# Patient Record
Sex: FEMALE | Race: BLACK OR AFRICAN AMERICAN | Employment: UNEMPLOYED | URBAN - METROPOLITAN AREA
[De-identification: names, ages, dates, MRNs, and addresses within clinical notes are randomized per-mention and may not be internally consistent; named-entity substitution may affect disease eponyms.]

---

## 2017-02-13 ENCOUNTER — OFFICE VISIT (OUTPATIENT)
Dept: ENDOCRINOLOGY | Age: 47
End: 2017-02-13

## 2017-02-13 VITALS
RESPIRATION RATE: 16 BRPM | HEART RATE: 67 BPM | WEIGHT: 169.8 LBS | SYSTOLIC BLOOD PRESSURE: 115 MMHG | OXYGEN SATURATION: 100 % | DIASTOLIC BLOOD PRESSURE: 71 MMHG | HEIGHT: 66 IN | BODY MASS INDEX: 27.29 KG/M2

## 2017-02-13 DIAGNOSIS — Z79.4 TYPE 2 DIABETES MELLITUS WITHOUT COMPLICATION, WITH LONG-TERM CURRENT USE OF INSULIN (HCC): Primary | ICD-10-CM

## 2017-02-13 DIAGNOSIS — E78.49 OTHER HYPERLIPIDEMIA: ICD-10-CM

## 2017-02-13 DIAGNOSIS — I10 ESSENTIAL HYPERTENSION: ICD-10-CM

## 2017-02-13 DIAGNOSIS — E55.9 VITAMIN D DEFICIENCY: ICD-10-CM

## 2017-02-13 DIAGNOSIS — E11.9 TYPE 2 DIABETES MELLITUS WITHOUT COMPLICATION, WITH LONG-TERM CURRENT USE OF INSULIN (HCC): Primary | ICD-10-CM

## 2017-02-13 LAB — HBA1C MFR BLD: 6.5 %

## 2017-02-13 PROCEDURE — 83036 HEMOGLOBIN GLYCOSYLATED A1C: CPT | Performed by: INTERNAL MEDICINE

## 2017-02-13 PROCEDURE — 99214 OFFICE O/P EST MOD 30 MIN: CPT | Performed by: INTERNAL MEDICINE

## 2017-02-13 RX ORDER — CAL/VIT B12/FOLIC ACID/VIT B6
TABLET ORAL
Qty: 30 TABLET | Refills: 2 | Status: SHIPPED | OUTPATIENT
Start: 2017-02-13 | End: 2017-03-22 | Stop reason: SDUPTHER

## 2017-02-13 RX ORDER — ERGOCALCIFEROL 1.25 MG/1
CAPSULE ORAL
Qty: 13 CAPSULE | Refills: 2 | Status: SHIPPED | OUTPATIENT
Start: 2017-02-13 | End: 2017-09-12 | Stop reason: SDUPTHER

## 2017-02-13 RX ORDER — BIOTIN 1 MG
TABLET ORAL
Qty: 1 KIT | Refills: 0 | Status: SHIPPED | OUTPATIENT
Start: 2017-02-13

## 2017-02-13 RX ORDER — FENOFIBRATE 48 MG/1
TABLET, COATED ORAL
Qty: 90 TABLET | Refills: 2 | Status: SHIPPED | OUTPATIENT
Start: 2017-02-13 | End: 2017-12-07 | Stop reason: SDUPTHER

## 2017-02-13 RX ORDER — SIMVASTATIN 40 MG
TABLET ORAL
Qty: 90 TABLET | Refills: 1 | Status: SHIPPED | OUTPATIENT
Start: 2017-02-13 | End: 2017-09-12 | Stop reason: SDUPTHER

## 2017-03-22 ENCOUNTER — TELEPHONE (OUTPATIENT)
Dept: ENDOCRINOLOGY | Age: 47
End: 2017-03-22

## 2017-03-22 RX ORDER — CAL/VIT B12/FOLIC ACID/VIT B6
TABLET ORAL
Qty: 30 TABLET | Refills: 2 | Status: SHIPPED | OUTPATIENT
Start: 2017-03-22

## 2017-05-15 ENCOUNTER — OFFICE VISIT (OUTPATIENT)
Dept: ENDOCRINOLOGY | Age: 47
End: 2017-05-15

## 2017-05-15 VITALS
BODY MASS INDEX: 25.84 KG/M2 | OXYGEN SATURATION: 99 % | WEIGHT: 160.8 LBS | HEIGHT: 66 IN | HEART RATE: 69 BPM | RESPIRATION RATE: 16 BRPM | DIASTOLIC BLOOD PRESSURE: 64 MMHG | SYSTOLIC BLOOD PRESSURE: 115 MMHG

## 2017-05-15 DIAGNOSIS — Z79.4 TYPE 2 DIABETES MELLITUS WITHOUT COMPLICATION, WITH LONG-TERM CURRENT USE OF INSULIN (HCC): Primary | ICD-10-CM

## 2017-05-15 DIAGNOSIS — I10 ESSENTIAL HYPERTENSION: ICD-10-CM

## 2017-05-15 DIAGNOSIS — E78.49 OTHER HYPERLIPIDEMIA: ICD-10-CM

## 2017-05-15 DIAGNOSIS — E11.9 TYPE 2 DIABETES MELLITUS WITHOUT COMPLICATION, WITH LONG-TERM CURRENT USE OF INSULIN (HCC): Primary | ICD-10-CM

## 2017-05-15 DIAGNOSIS — E55.9 VITAMIN D DEFICIENCY: ICD-10-CM

## 2017-05-15 LAB — HBA1C MFR BLD: 7 %

## 2017-05-15 PROCEDURE — 99213 OFFICE O/P EST LOW 20 MIN: CPT | Performed by: INTERNAL MEDICINE

## 2017-05-15 PROCEDURE — 83036 HEMOGLOBIN GLYCOSYLATED A1C: CPT | Performed by: INTERNAL MEDICINE

## 2017-07-12 ENCOUNTER — TELEPHONE (OUTPATIENT)
Dept: ENDOCRINOLOGY | Age: 47
End: 2017-07-12

## 2017-08-16 ENCOUNTER — OFFICE VISIT (OUTPATIENT)
Dept: ENDOCRINOLOGY | Age: 47
End: 2017-08-16

## 2017-08-16 VITALS
HEIGHT: 66 IN | DIASTOLIC BLOOD PRESSURE: 69 MMHG | SYSTOLIC BLOOD PRESSURE: 107 MMHG | OXYGEN SATURATION: 100 % | WEIGHT: 158.4 LBS | RESPIRATION RATE: 16 BRPM | HEART RATE: 54 BPM | BODY MASS INDEX: 25.46 KG/M2

## 2017-08-16 DIAGNOSIS — E55.9 VITAMIN D DEFICIENCY: ICD-10-CM

## 2017-08-16 DIAGNOSIS — E78.5 HYPERLIPIDEMIA, UNSPECIFIED: ICD-10-CM

## 2017-08-16 DIAGNOSIS — I10 ESSENTIAL HYPERTENSION: ICD-10-CM

## 2017-08-16 DIAGNOSIS — E11.9 CONTROLLED TYPE 2 DIABETES MELLITUS WITHOUT COMPLICATION, WITHOUT LONG-TERM CURRENT USE OF INSULIN (HCC): Primary | ICD-10-CM

## 2017-08-16 DIAGNOSIS — Z79.4 TYPE 2 DIABETES MELLITUS WITHOUT COMPLICATION, WITH LONG-TERM CURRENT USE OF INSULIN (HCC): ICD-10-CM

## 2017-08-16 DIAGNOSIS — E11.9 TYPE 2 DIABETES MELLITUS WITHOUT COMPLICATION, WITH LONG-TERM CURRENT USE OF INSULIN (HCC): ICD-10-CM

## 2017-08-16 LAB
CHOLESTEROL, TOTAL: 158 MG/DL (ref 0–199)
HDLC SERPL-MCNC: 49 MG/DL (ref 40–60)
LDL CHOLESTEROL CALCULATED: 95 MG/DL
TRIGL SERPL-MCNC: 72 MG/DL (ref 0–150)
VITAMIN D 25-HYDROXY: 53.5 NG/ML
VLDLC SERPL CALC-MCNC: 14 MG/DL

## 2017-08-16 PROCEDURE — 99213 OFFICE O/P EST LOW 20 MIN: CPT | Performed by: INTERNAL MEDICINE

## 2017-08-17 ENCOUNTER — TELEPHONE (OUTPATIENT)
Dept: ENDOCRINOLOGY | Age: 47
End: 2017-08-17

## 2017-08-17 LAB
ESTIMATED AVERAGE GLUCOSE: 171.4 MG/DL
HBA1C MFR BLD: 7.6 %

## 2017-09-12 ENCOUNTER — TELEPHONE (OUTPATIENT)
Dept: ENDOCRINOLOGY | Age: 47
End: 2017-09-12

## 2017-09-12 RX ORDER — LISINOPRIL 20 MG/1
20 TABLET ORAL DAILY
Qty: 90 TABLET | Refills: 3 | Status: SHIPPED | OUTPATIENT
Start: 2017-09-12

## 2017-09-12 RX ORDER — SIMVASTATIN 40 MG
TABLET ORAL
Qty: 90 TABLET | Refills: 1 | Status: CANCELLED | OUTPATIENT
Start: 2017-09-12

## 2017-09-12 RX ORDER — ERGOCALCIFEROL 1.25 MG/1
CAPSULE ORAL
Qty: 13 CAPSULE | Refills: 2 | Status: CANCELLED | OUTPATIENT
Start: 2017-09-12

## 2017-09-12 RX ORDER — ERGOCALCIFEROL 1.25 MG/1
CAPSULE ORAL
Qty: 13 CAPSULE | Refills: 2 | Status: SHIPPED | OUTPATIENT
Start: 2017-09-12

## 2017-09-12 RX ORDER — LISINOPRIL 20 MG/1
20 TABLET ORAL DAILY
Qty: 90 TABLET | Refills: 3 | Status: CANCELLED | OUTPATIENT
Start: 2017-09-12

## 2017-09-12 RX ORDER — SIMVASTATIN 40 MG
TABLET ORAL
Qty: 90 TABLET | Refills: 1 | Status: SHIPPED | OUTPATIENT
Start: 2017-09-12 | End: 2019-04-05

## 2017-09-18 ENCOUNTER — TELEPHONE (OUTPATIENT)
Dept: ENDOCRINOLOGY | Age: 47
End: 2017-09-18

## 2017-12-07 RX ORDER — FENOFIBRATE 48 MG/1
TABLET, COATED ORAL
Qty: 90 TABLET | Refills: 2 | Status: SHIPPED | OUTPATIENT
Start: 2017-12-07

## 2018-03-28 NOTE — TELEPHONE ENCOUNTER
LOC: 8/16/17  NOV: none
normal/denies loose teeth/dentures - temporary partial bridge (fixed) upper/lower

## 2019-03-11 ENCOUNTER — TELEPHONE (OUTPATIENT)
Dept: PAIN MANAGEMENT | Age: 49
End: 2019-03-11

## 2019-04-05 ENCOUNTER — OFFICE VISIT (OUTPATIENT)
Dept: PAIN MANAGEMENT | Age: 49
End: 2019-04-05
Payer: MEDICARE

## 2019-04-05 ENCOUNTER — TELEPHONE (OUTPATIENT)
Dept: PAIN MANAGEMENT | Age: 49
End: 2019-04-05

## 2019-04-05 VITALS
HEIGHT: 66 IN | WEIGHT: 161 LBS | BODY MASS INDEX: 25.88 KG/M2 | DIASTOLIC BLOOD PRESSURE: 84 MMHG | HEART RATE: 90 BPM | SYSTOLIC BLOOD PRESSURE: 140 MMHG

## 2019-04-05 DIAGNOSIS — M96.1 FAILED BACK SYNDROME, LUMBOSACRAL: ICD-10-CM

## 2019-04-05 DIAGNOSIS — M50.30 BULGING OF CERVICAL INTERVERTEBRAL DISC: ICD-10-CM

## 2019-04-05 DIAGNOSIS — M50.90 CERVICAL DISC DISEASE: ICD-10-CM

## 2019-04-05 DIAGNOSIS — M54.41 CHRONIC BILATERAL LOW BACK PAIN WITH BILATERAL SCIATICA: ICD-10-CM

## 2019-04-05 DIAGNOSIS — G89.4 CHRONIC PAIN SYNDROME: Primary | ICD-10-CM

## 2019-04-05 DIAGNOSIS — M54.42 CHRONIC BILATERAL LOW BACK PAIN WITH BILATERAL SCIATICA: ICD-10-CM

## 2019-04-05 DIAGNOSIS — M51.37 DDD (DEGENERATIVE DISC DISEASE), LUMBOSACRAL: ICD-10-CM

## 2019-04-05 DIAGNOSIS — M25.551 RIGHT HIP PAIN: ICD-10-CM

## 2019-04-05 DIAGNOSIS — R53.83 FATIGUE, UNSPECIFIED TYPE: ICD-10-CM

## 2019-04-05 DIAGNOSIS — F51.01 PRIMARY INSOMNIA: ICD-10-CM

## 2019-04-05 DIAGNOSIS — F32.A DEPRESSION, UNSPECIFIED DEPRESSION TYPE: ICD-10-CM

## 2019-04-05 DIAGNOSIS — G89.29 CHRONIC RIGHT SHOULDER PAIN: ICD-10-CM

## 2019-04-05 DIAGNOSIS — Z98.890 H/O SHOULDER SURGERY: ICD-10-CM

## 2019-04-05 DIAGNOSIS — Z96.611 STATUS POST REPLACEMENT OF RIGHT SHOULDER JOINT: ICD-10-CM

## 2019-04-05 DIAGNOSIS — M25.511 CHRONIC RIGHT SHOULDER PAIN: ICD-10-CM

## 2019-04-05 DIAGNOSIS — M48.02 CERVICAL SPINAL STENOSIS: ICD-10-CM

## 2019-04-05 DIAGNOSIS — G89.29 CHRONIC BILATERAL LOW BACK PAIN WITH BILATERAL SCIATICA: ICD-10-CM

## 2019-04-05 PROCEDURE — G8419 CALC BMI OUT NRM PARAM NOF/U: HCPCS | Performed by: NURSE PRACTITIONER

## 2019-04-05 PROCEDURE — G8427 DOCREV CUR MEDS BY ELIG CLIN: HCPCS | Performed by: NURSE PRACTITIONER

## 2019-04-05 PROCEDURE — 99204 OFFICE O/P NEW MOD 45 MIN: CPT | Performed by: NURSE PRACTITIONER

## 2019-04-05 RX ORDER — DULOXETIN HYDROCHLORIDE 30 MG/1
30 CAPSULE, DELAYED RELEASE ORAL 2 TIMES DAILY
Qty: 60 CAPSULE | Refills: 0 | Status: SHIPPED | OUTPATIENT
Start: 2019-04-05 | End: 2019-05-03

## 2019-04-05 RX ORDER — ESTRADIOL 0.06 MG/D
1 PATCH TRANSDERMAL WEEKLY
COMMUNITY

## 2019-04-05 RX ORDER — AMITRIPTYLINE HYDROCHLORIDE 25 MG/1
25 TABLET, FILM COATED ORAL NIGHTLY
Qty: 28 TABLET | Refills: 0 | Status: SHIPPED | OUTPATIENT
Start: 2019-04-05 | End: 2019-05-03 | Stop reason: SDUPTHER

## 2019-04-05 RX ORDER — HYDROCODONE BITARTRATE AND ACETAMINOPHEN 5; 325 MG/1; MG/1
1 TABLET ORAL EVERY 8 HOURS PRN
Qty: 84 TABLET | Refills: 0 | Status: SHIPPED | OUTPATIENT
Start: 2019-04-05 | End: 2019-05-03 | Stop reason: SDUPTHER

## 2019-04-05 RX ORDER — FENOFIBRATE 145 MG/1
145 TABLET, COATED ORAL DAILY
COMMUNITY

## 2019-04-05 RX ORDER — METHOCARBAMOL 750 MG/1
750 TABLET, FILM COATED ORAL 2 TIMES DAILY
COMMUNITY

## 2019-04-05 RX ORDER — DICLOFENAC SODIUM 75 MG/1
75 TABLET, DELAYED RELEASE ORAL 2 TIMES DAILY
COMMUNITY

## 2019-04-05 NOTE — PROGRESS NOTES
HISTORY OF PRESENT ILLNESS:  Ms. Claire Abreu is a 50 y.o. female presents for consultation at the kind request of Dr. Angela Brown her orthopedics provider for chronic pain management. Her presenting problems are pain in the right shoulder, neck, lower back radiaiting to the lower extremities, right hip. Onset of pain in the lower back began in 2009 while working as a , reports having fallen while getting off the bus twisting her back in the process. She was treated by Dr. Surekha Barrientos with physical medicine and rehabilitation, as well as Dr. Micaela Barrera with orthopedic surgery. She had lumbar laminectomy surgery in 2015, then lumbar fusion in 2016 both by Dr. Siena Blake. Admits to having received ELLIOTT's in the cervical, and lumbar spine through [facility] which provided short term relief of pain. She was recommended for cervical surgery but declined. Continues to experience pain in the cervical/lumbar spine with increased physical activities. 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 Admits to a history of right rotator cuff and right biceps tear, arthritis in the right shoulder, frozen shoulder. She's had surgery to the right shoulder 3 times, first in 2013 under the care of Dr. Rosie Davis, then she had right rotator cuff arthroscopy/repair on 8/28/18, and total right shoulder replacement surgery on 2/18/19 both with Dr. Saadia Carroll. Admits to having pain in the right shoulder mainly when lifting objects, performing daily activities. Percocet 5-325 mg have primarily been prescribed by orthopedics post surgery, with the last prescription having pain 2/18/19 x9 days. Other health conditions include DM2 with last hemoglobin A1c of 7.1 three months ago, GERD, HLD, hypertension. She had umbilical hernia repair on 2/21/2019 with Dr. Deonna Crane. She rates the pain in the lower back/right shoulder at 7/10, 5/10 in the legs, 8/10 in the neck.  She describes it as aching, burning, numbness, tingling, pins and needles, stabbing. Pain is greater in her neck/right shoulder than lower back. Pain is made worse by: lifting, straining, carrying groceries, reaching overhead, sleeping. Activities that have been limited by pain that she otherwise tolerated well are socializing, and cooking. Alternative therapies she haspreviously attempted are pain medications, anti-inflammatories, muscle relaxants, oral steroids, local injections, ice/heat pack, traction, brace/cane, ELLIOTT's, TENS unit, exercise by therapist, manipulation by chiropractor, massage therapy. Current treatment regimen has helped relieve about 30% of the pain. Relieving factors of pain include nothing. She denies side effects from the current pain regimen. In the last week she reports having extreme bothersome symptoms to the lower back, legs, numbness weakness to the feet all the time. Pain prevents her from dressing without discomfort, lifting heavy objects, sitting for more than 30 minutes, standing more than 30 minutes. She essentially has no social/recreational or sexual life due to pain. Patient reports mood is depressed and that she has no suicidal/homicidal inclination. Depression stems from the loss of her son in 2016 getting hit by a  that drove into their home by accident, pain, marital issues/stressors, reports  left her home. Admits to having been treated b psychiatry in the past, was reluctant to take antidepressants. Sleep patterns are poor with an average of 3-4 hours due to pain. Patient denies neurological bowel or bladder. Patient denies currently misusing/abusing her narcotic pain medications or using any illegal drugs. Admitted to having used marijuana a couple of weeks ago after she ran out of pain medications to assess pain relief, she got no pain relief from marijuana. she admits to morning stiffness, fatigue, and headaches. Reports headaches are mainly due to cervical pain.  Currently lives with her 2 sons, and is on disability. Denies smoking cigarettes     ROS:  The patient's social history, past medical history, family history, medications, allergies and review of systems have all been reviewed and verified from  the patient questionnaire form which has been filled by the patient. The  allergies, medication list  and past medical and surgical history and other information recorded by the MA was again reviewed on April 6, 2019. Please check page 6 of the patient questionnaire for for family history  These forms have been scanned into the \"media\" tab of patients electronic medical record. Family History   Problem Relation Age of Onset    Diabetes Mother     High Blood Pressure Father     Liver Disease Father         cirrhosis of the liver    Liver Disease Sister     High Blood Pressure Sister     Alzheimer's Disease Maternal Grandmother     Diabetes Sister     High Blood Pressure Brother     High Cholesterol Brother     ADHD Son     Other Son         PTSD    Other Son         traumatic brain injury       Past Medical History:   Diagnosis Date    Back pain, chronic     Hyperlipidemia     Hypertension     Type II or unspecified type diabetes mellitus without mention of complication, not stated as uncontrolled           Past Surgical History:   Procedure Laterality Date    BACK SURGERY      BREAST SURGERY      breast reduction    CHOLECYSTECTOMY      SHOULDER SURGERY Right     TUBAL LIGATION         Physical Exam   Constitutional: She is oriented to person, place, and time. She appears well-developed and well-nourished. No distress. HENT:   Head: Normocephalic and atraumatic. Nose: Nose normal.   Mouth/Throat: Oropharynx is clear and moist.   Eyes: Pupils are equal, round, and reactive to light. Conjunctivae and EOM are normal.   Neck: Normal range of motion. Neck supple. No thyromegaly present.    Cardiovascular: Normal rate, regular rhythm, normal heart sounds and intact distal pulses. Pulmonary/Chest: Effort normal and breath sounds normal. No respiratory distress. She exhibits no tenderness. Abdominal: Soft. Bowel sounds are normal. She exhibits no distension. There is tenderness (Mild epigastric tenderness with palpation). No hernia. Musculoskeletal: She exhibits tenderness. She exhibits no edema. Right shoulder: She exhibits decreased range of motion and tenderness (Guarded pain with 50° flexion/abduction). She exhibits no swelling and no deformity. Right hip: She exhibits decreased range of motion, decreased strength and tenderness (40° flexion with guarded hip/lumbar pain). Left hip: She exhibits tenderness (Tenderness to the lumbar spine with 70° flexion). Cervical back: She exhibits decreased range of motion, tenderness (Guarded pain with 45° rotation to the L/R, palpation) and bony tenderness. Lumbar back: She exhibits decreased range of motion, tenderness (Guarded pain with 30° flexion, 5° extension, 5° lateral bend) and bony tenderness. Right upper leg: She exhibits tenderness. Left upper leg: She exhibits tenderness. Right lower leg: She exhibits tenderness. Left lower leg: She exhibits tenderness. Lymphadenopathy:     She has no cervical adenopathy. She has no axillary adenopathy. Neurological: She is alert and oriented to person, place, and time. She has normal strength and normal reflexes. She displays normal reflexes. No cranial nerve deficit or sensory deficit. She displays a negative Romberg sign. Gait normal.   Reflex Scores:       Patellar reflexes are 2+ on the right side and 2+ on the left side. Achilles reflexes are 2+ on the right side and 2+ on the left side. Skin: Skin is warm, dry and intact. No rash noted. No cyanosis. Nails show no clubbing. Right shoulder scar, lumbar region   Psychiatric: Her speech is normal and behavior is normal. Her affect is blunt.  She exhibits a depressed mood (tearful). MRI of the Lumbar spine 2/13/17:  Previous lumbar fusion with anterior fixation plate and discectomy/bone  graft at L5-S1. No residual canal or foraminal stenosis at this level. Previous left hemilaminectomy at the L5-S1 level with some enhancing  scar tissue seen surrounding the descending left S1 nerve root within  the lateral recess. Lumbar spondylosis as described in detail above within the upper and  mid lumbar spine, with no evidence of additional nerve root impingement  or central stenosis. MRI of the Cervical spine 1/22/16:  1. Multiple cervical disc protrusions as described. 2. C5-C6 disc protrusion with mild spinal cord impingement. 3. C7-T1 right foraminal disc protrusion causing moderate foraminal  stenosis and potential impingement of the right C8 nerve. Correlate for  right C8 radiculopathy. MRI of the upper right Extremity 4/4/18:  1. There is edema identified within the proximal supraspinatus musculotendinous junction consistent with a muscle strain. 2. Small partial-thickness bursal surface tear is identified involving the critical zone fibers of the supraspinatus tendon. No full-thickness rotator cuff tear is identified. 3. Mild subacromial and subdeltoid edema is present. This can be seen with a mild bursitis. 4. Mild degenerative change is present at the acromioclavicular joint. 5. Torn and distally retracted biceps tendon. 6. Abnormal linear signal is identified within the superior and posterior superior labrum consistent with a labral tear. BP (!) 140/84   Pulse 90   Ht 5' 6\" (1.676 m)   Wt 161 lb (73 kg)   LMP 12/08/2015   BMI 25.99 kg/m²      ASSESSMENT:    1. Chronic pain syndrome    2. Status post replacement of right shoulder joint, with Dr. Yakelin Willis 2/18/19    3. Chronic right shoulder pain    4. H/O shoulder surgery, 2013/2018    5. DDD (degenerative disc disease), lumbosacral    6.  Chronic bilateral low back pain with bilateral sciatica    7. Failed back syndrome, lumbosacral    8. Right hip pain    9. Cervical disc disease    10. Bulging of cervical intervertebral disc    11. Cervical spinal stenosis    12. Depression, unspecified depression type    13. Primary insomnia    14. Fatigue, unspecified type         PLAN:   -Chronic opiate treatment protocol was discussed withthe patient, informed consent was obtained. -Treatment guidelines were discussed and established  -Risks and benefits of narcotics were addressedwith the patient  - Obtainable long term and short term goals of opioid therapy were reviewed, including pain relief, sleep, psychosocial and physical functioning   -Obtain urine Toxicology today  -Take Norco 5-3 25 mg 3 times a day when necessary, Elavil 25 mg by mouth nightly, Cymbalta 30 mg by mouth daily, ZTLido 1.8% topical daily  -Maintain PT, Pilar exercises/cervical stretches  -Reviewed previous imaging studies/blood work  -TSH was ordered-fatigue/depression  -Continue to follow-up with Dr. Rigo Hollingsworth post right shoulder replacement surgery  -Counseled patient on positive coping skills related to her son's death, and family related stressors. Recommended psychological counseling, patient was agreeable. Referral was provided for psychological couseling  -Informed patient that opioids cannot be prescribed for her while using marijuana, she can otherwise be treated with non opioids if she chose to continue with marijuana. Advised to quit using marijuana if she hoped to obtain opioids therapy for pain.  Patient verbalized understanding, and reports only having used marijuana once, will reevaluate after examining UDS results  -CBT techniques- relaxation therapies such as biofeedback, mindfulness based stress reduction, imagery, cognitive restructuring, problem solving discussed with patient  -she was advised proper sleep hygiene-told to avoid:use of caffeine or other stimulants after noon, alcohol use near bedtime, long or frequent naps during the day, erratic sleep schedule, heavy meals near bedtime, vigorous exercise near bedtime and use of electronic devices near bedtime  -SOAPP score 4  -ORT score 2  -PHQ-9 score 22 severe depression  -Return in about 1 month (around 5/3/2019). Controlled Substances Monitoring: Attestation: The Prescription Monitoring Report for this patient was reviewed today.  DIMITRI Reagan - EMERY)

## 2019-04-05 NOTE — PATIENT INSTRUCTIONS
Patient Education        Back Stretches: Exercises  Your Care Instructions  Here are some examples of exercises for stretching your back. Start each exercise slowly. Ease off the exercise if you start to have pain. Your doctor or physical therapist will tell you when you can start these exercises and which ones will work best for you. How to do the exercises  Overhead stretch    1. Stand comfortably with your feet shoulder-width apart. 2. Looking straight ahead, raise both arms over your head and reach toward the ceiling. Do not allow your head to tilt back. 3. Hold for 15 to 30 seconds, then lower your arms to your sides. 4. Repeat 2 to 4 times. Side stretch    1. Stand comfortably with your feet shoulder-width apart. 2. Raise one arm over your head, and then lean to the other side. 3. Slide your hand down your leg as you let the weight of your arm gently stretch your side muscles. Hold for 15 to 30 seconds. 4. Repeat 2 to 4 times on each side. Press-up    1. Lie on your stomach, supporting your body with your forearms. 2. Press your elbows down into the floor to raise your upper back. As you do this, relax your stomach muscles and allow your back to arch without using your back muscles. As your press up, do not let your hips or pelvis come off the floor. 3. Hold for 15 to 30 seconds, then relax. 4. Repeat 2 to 4 times. Relax and rest    1. Lie on your back with a rolled towel under your neck and a pillow under your knees. Extend your arms comfortably to your sides. 2. Relax and breathe normally. 3. Remain in this position for about 10 minutes. 4. If you can, do this 2 or 3 times each day. Follow-up care is a key part of your treatment and safety. Be sure to make and go to all appointments, and call your doctor if you are having problems. It's also a good idea to know your test results and keep a list of the medicines you take. Where can you learn more?   Go to shoulder. 4. Hold 15 to 30 seconds. 5. Repeat 2 to 4 times. Shoulder blade squeeze    1. Stand with your arms at your sides, and squeeze your shoulder blades together. Do not raise your shoulders up as you squeeze. 2. Hold 6 seconds. 3. Repeat 8 to 12 times. Resisted rows    1. Put the band around a solid object at about waist level. (A bedpost will work well.) Each hand should hold an end of the band. 2. With your elbows at your sides and bent to 90 degrees, pull the band back. Your shoulder blades should move toward each other. Return to the starting position. 3. Repeat 8 to 12 times. External rotator strengthening exercise    1. Start by tying a piece of elastic exercise material to a doorknob. You can use surgical tubing or Thera-Band. (You may also hold one end of the band in each hand.)  2. Stand or sit with your shoulder relaxed and your elbow bent 90 degrees. Your upper arm should rest comfortably against your side. Squeeze a rolled towel between your elbow and your body for comfort. This will help keep your arm at your side. 3. Hold one end of the elastic band with the hand of the painful arm. 4. Start with your forearm across your belly. Slowly rotate the forearm out away from your body. Keep your elbow and upper arm tucked against the towel roll or the side of your body until you begin to feel tightness in your shoulder. Slowly move your arm back to where you started. 5. Repeat 8 to 12 times. Internal rotator strengthening exercise    1. Start by tying a piece of elastic exercise material to a doorknob. You can use surgical tubing or Thera-Band. 2. Stand or sit with your shoulder relaxed and your elbow bent 90 degrees. Your upper arm should rest comfortably against your side. Squeeze a rolled towel between your elbow and your body for comfort. This will help keep your arm at your side. 3. Hold one end of the elastic band in the hand of the painful arm.   4. Slowly rotate your forearm toward your body until it touches your belly. Slowly move it back to where you started. 5. Keep your elbow and upper arm firmly tucked against the towel roll or at your side. 6. Repeat 8 to 12 times. Pendulum swing    1. Hold on to a table or the back of a chair with your good arm. Then bend forward a little and let your sore arm hang straight down. This exercise does not use the arm muscles. Rather, use your legs and your hips to create movement that makes your arm swing freely. 2. Use the movement from your hips and legs to guide the slightly swinging arm back and forth like a pendulum (or elephant trunk). Then guide it in circles that start small (about the size of a dinner plate). Make the circles a bit larger each day, as your pain allows. 3. Do this exercise for 5 minutes, 5 to 7 times each day. 4. As you have less pain, try bending over a little farther to do this exercise. This will increase the amount of movement at your shoulder. Follow-up care is a key part of your treatment and safety. Be sure to make and go to all appointments, and call your doctor if you are having problems. It's also a good idea to know your test results and keep a list of the medicines you take. Where can you learn more? Go to https://[email or web address]. org and sign in to your [employer] account. Enter H562 in the [facility] box to learn more about \"Shoulder Arthritis: Exercises. \"     If you do not have an account, please click on the \"Sign Up Now\" link. Current as of: September 20, 2018  Content Version: 11.9  © 8899-4820 [employer]. Care instructions adapted under license by [facility] ([facility]). If you have questions about a medical condition or this instruction, always ask your healthcare professional. [place] [address] any warranty or liability for your use of this information.          Patient Education        Neck Arthritis: Exercises  Your Care Instructions  Here are

## 2019-04-05 NOTE — TELEPHONE ENCOUNTER
I received a pa for ZTlido 1.8% EX PTCH, the only approved diagnoses are as follows: Diabetic neuropathy, Neuropathy due to shingles. Would PKA like to prescribe the patient something else? Please advise. Thank you.

## 2019-04-09 ENCOUNTER — TELEPHONE (OUTPATIENT)
Dept: PAIN MANAGEMENT | Age: 49
End: 2019-04-09

## 2019-04-09 NOTE — TELEPHONE ENCOUNTER
Per PKA if patient has side effects with the Cymbalta then she may discontinue it. Called patient to advise that she must follow up with medication issues here with us other than her PCP. Patient did not answer, phone kept ringing.

## 2019-04-09 NOTE — TELEPHONE ENCOUNTER
Pt calling, states she has no appetite on Cymbalta and it also makes her feel strange/paranoid. She states she lost 4 pounds from [place] [place] to now. She also reports her blood sugar has been low and she has had dry mouth. She states she spoke to her PCP and will be stopping this medication per their advise.

## 2019-04-15 ENCOUNTER — TELEPHONE (OUTPATIENT)
Dept: PAIN MANAGEMENT | Age: 49
End: 2019-04-15

## 2019-04-15 DIAGNOSIS — M25.552 LEFT HIP PAIN: ICD-10-CM

## 2019-04-15 DIAGNOSIS — G89.4 CHRONIC PAIN SYNDROME: Primary | ICD-10-CM

## 2019-04-15 NOTE — TELEPHONE ENCOUNTER
Pt is calling to inform PKA that the Xray was ordered wrong. Pt states that xray was ordered for the Rt hip but needs to be done for the Lt not Rt. Pls call pt and advise when order is changed.

## 2019-04-15 NOTE — TELEPHONE ENCOUNTER
Note regarding overdue X-Ray ordered on 04/05/19. Per PKA , this testing was requested but not urgent. We will follow up with patient at their next office visit, no follow up is necessary at this time.

## 2019-05-01 ENCOUNTER — HOSPITAL ENCOUNTER (OUTPATIENT)
Dept: GENERAL RADIOLOGY | Age: 49
Discharge: HOME | End: 2019-05-01
Payer: MEDICARE

## 2019-05-01 ENCOUNTER — HOSPITAL ENCOUNTER (OUTPATIENT)
Age: 49
Discharge: HOME | End: 2019-05-01
Payer: MEDICARE

## 2019-05-01 ENCOUNTER — TELEPHONE (OUTPATIENT)
Dept: PAIN MANAGEMENT | Age: 49
End: 2019-05-01

## 2019-05-01 DIAGNOSIS — G89.4 CHRONIC PAIN SYNDROME: ICD-10-CM

## 2019-05-01 DIAGNOSIS — R53.83 FATIGUE, UNSPECIFIED TYPE: ICD-10-CM

## 2019-05-01 DIAGNOSIS — M25.551 RIGHT HIP PAIN: ICD-10-CM

## 2019-05-01 LAB — TSH SERPL DL<=0.05 MIU/L-ACNC: 1.23 UIU/ML (ref 0.27–4.2)

## 2019-05-01 PROCEDURE — 73502 X-RAY EXAM HIP UNI 2-3 VIEWS: CPT

## 2019-05-03 ENCOUNTER — OFFICE VISIT (OUTPATIENT)
Dept: PAIN MANAGEMENT | Age: 49
End: 2019-05-03
Payer: MEDICARE

## 2019-05-03 VITALS — OXYGEN SATURATION: 100 % | HEART RATE: 62 BPM | SYSTOLIC BLOOD PRESSURE: 142 MMHG | DIASTOLIC BLOOD PRESSURE: 84 MMHG

## 2019-05-03 DIAGNOSIS — M50.90 CERVICAL DISC DISEASE: ICD-10-CM

## 2019-05-03 DIAGNOSIS — R53.83 FATIGUE, UNSPECIFIED TYPE: ICD-10-CM

## 2019-05-03 DIAGNOSIS — M51.37 DDD (DEGENERATIVE DISC DISEASE), LUMBOSACRAL: ICD-10-CM

## 2019-05-03 DIAGNOSIS — M50.30 BULGING OF CERVICAL INTERVERTEBRAL DISC: ICD-10-CM

## 2019-05-03 DIAGNOSIS — G89.4 CHRONIC PAIN SYNDROME: ICD-10-CM

## 2019-05-03 DIAGNOSIS — M48.02 CERVICAL SPINAL STENOSIS: ICD-10-CM

## 2019-05-03 DIAGNOSIS — G89.29 CHRONIC BILATERAL LOW BACK PAIN WITH BILATERAL SCIATICA: ICD-10-CM

## 2019-05-03 DIAGNOSIS — F51.01 PRIMARY INSOMNIA: ICD-10-CM

## 2019-05-03 DIAGNOSIS — G89.4 CHRONIC PAIN SYNDROME: Primary | ICD-10-CM

## 2019-05-03 DIAGNOSIS — F32.A DEPRESSION, UNSPECIFIED DEPRESSION TYPE: ICD-10-CM

## 2019-05-03 DIAGNOSIS — M54.42 CHRONIC BILATERAL LOW BACK PAIN WITH BILATERAL SCIATICA: ICD-10-CM

## 2019-05-03 DIAGNOSIS — M96.1 FAILED BACK SYNDROME, LUMBOSACRAL: ICD-10-CM

## 2019-05-03 DIAGNOSIS — Z96.611 STATUS POST REPLACEMENT OF RIGHT SHOULDER JOINT: ICD-10-CM

## 2019-05-03 DIAGNOSIS — M16.12 PRIMARY OSTEOARTHRITIS OF LEFT HIP: ICD-10-CM

## 2019-05-03 DIAGNOSIS — F41.9 ANXIETY: ICD-10-CM

## 2019-05-03 DIAGNOSIS — Z98.890 H/O SHOULDER SURGERY: ICD-10-CM

## 2019-05-03 DIAGNOSIS — M54.41 CHRONIC BILATERAL LOW BACK PAIN WITH BILATERAL SCIATICA: ICD-10-CM

## 2019-05-03 PROCEDURE — 1036F TOBACCO NON-USER: CPT | Performed by: NURSE PRACTITIONER

## 2019-05-03 PROCEDURE — G8419 CALC BMI OUT NRM PARAM NOF/U: HCPCS | Performed by: NURSE PRACTITIONER

## 2019-05-03 PROCEDURE — G8427 DOCREV CUR MEDS BY ELIG CLIN: HCPCS | Performed by: NURSE PRACTITIONER

## 2019-05-03 PROCEDURE — 99214 OFFICE O/P EST MOD 30 MIN: CPT | Performed by: NURSE PRACTITIONER

## 2019-05-03 RX ORDER — HYDROCODONE BITARTRATE AND ACETAMINOPHEN 5; 325 MG/1; MG/1
1 TABLET ORAL EVERY 8 HOURS PRN
Qty: 30 TABLET | Refills: 0 | Status: SHIPPED | OUTPATIENT
Start: 2019-05-03 | End: 2019-05-13

## 2019-05-03 RX ORDER — ESCITALOPRAM OXALATE 10 MG/1
10 TABLET ORAL DAILY
Qty: 30 TABLET | Refills: 3 | Status: SHIPPED | OUTPATIENT
Start: 2019-05-03 | End: 2019-05-03 | Stop reason: SDUPTHER

## 2019-05-03 RX ORDER — LIDOCAINE 4 G/G
1 PATCH TOPICAL DAILY
Qty: 30 PATCH | Refills: 0 | Status: SHIPPED | OUTPATIENT
Start: 2019-05-03 | End: 2019-06-02

## 2019-05-03 RX ORDER — AMITRIPTYLINE HYDROCHLORIDE 25 MG/1
25 TABLET, FILM COATED ORAL NIGHTLY
Qty: 28 TABLET | Refills: 0 | Status: SHIPPED | OUTPATIENT
Start: 2019-05-03 | End: 2019-05-03 | Stop reason: SDUPTHER

## 2019-05-03 RX ORDER — HYDROCODONE BITARTRATE AND ACETAMINOPHEN 5; 325 MG/1; MG/1
1 TABLET ORAL EVERY 8 HOURS PRN
Qty: 84 TABLET | Refills: 0 | Status: SHIPPED | OUTPATIENT
Start: 2019-05-03 | End: 2019-05-03 | Stop reason: SDUPTHER

## 2019-05-03 NOTE — PROGRESS NOTES
Nia Crespo [place]  1970  [id number]      HISTORY OF PRESENT ILLNESS:  Ms. Grace Levy is a 50 y.o. female returns for a follow up visit for pain management  She has a diagnosis of   1. Chronic pain syndrome    2. Status post replacement of right shoulder joint, with Dr. Sabra Abarca 2/18/19    3. H/O shoulder surgery, 2013/2018    4. DDD (degenerative disc disease), lumbosacral    5. Chronic bilateral low back pain with bilateral sciatica    6. Failed back syndrome, lumbosacral    7. Primary osteoarthritis of left hip, mild    8. Cervical disc disease    9. Bulging of cervical intervertebral disc    10. Cervical spinal stenosis    11. Fatigue, unspecified type    12. Primary insomnia    13. Depression, unspecified depression type    14. Anxiety      On the Patients Pain Assessment form:  She complains of pain in the right arm(s): entire limb, left buttocks, left knee(s), left leg(s): entire limb, left lower back, neck and right shoulder(s): entire area She rates the pain 7/10 and describes it as sharp, aching. Current treatment regimen has helped relieve about 30% of the pain. She has constipation and headache any side effects from the current pain regimen. Patient reports that since the last follow up visit the physical functioning is unchanged, family/social relationships are unchanged, mood is unchanged sleep patterns are worse, and that the overall functioning is unchanged. Patient denies misusing/abusing her narcotic pain medications or using any illegal drugs. There are No indicators for possible drug abuse, addiction or diversion problems. Upon obtaining medical history from Ms. Crespo states that pain is manageable on current pain therapy. Stopped taking the Cymbalta after a week due to side effects of suicidal thoughts, Elavil makes her drowsy during the daytime. Still has right shoulder pain, still recovering post right shoulder replacement 2 months ago.  Pain medications moderately provides relief of pain to the right shoulder for a short time. Mood is depressed she started following up with psychologist Ms Mary Mccall. Reports having felt much better after her visit. Sleep is improved with an average of 5-6 hours, except for daytime solemnest. Admits to having issues of constipation, currently scheduled for colonoscopy next week, admits to history of constipation. Tolerating activities/house chores with moderate tenderness to the back pain/right shoulder. Currently in PT    ALLERGIES: Patients list of allergies were reviewed     MEDICATIONS: Ms. Lagunas Risk list of medications were reviewed. Her current medications are   Outpatient Medications Prior to Visit   Medication Sig Dispense Refill    estradiol (CLIMARA) 0.06 MG/24HR Place 1 patch onto the skin once a week      methocarbamol (ROBAXIN) 750 MG tablet Take 750 mg by mouth 2 times daily      fenofibrate (TRICOR) 145 MG tablet Take 145 mg by mouth daily      metFORMIN (GLUCOPHAGE) 500 MG tablet Take 500 mg by mouth      diclofenac (VOLTAREN) 75 MG EC tablet Take 75 mg by mouth 2 times daily      fenofibrate (TRICOR) 48 MG tablet TAKE 1 TABLET BY MOUTH DAILY. 90 tablet 2    vitamin D (ERGOCALCIFEROL) 81481 units CAPS capsule TAKE 1 CAPSULE  WEEKLY 13 capsule 2    lisinopril (PRINIVIL;ZESTRIL) 20 MG tablet Take 1 tablet by mouth daily 90 tablet 3    Folic Acid-Vit R9-VNX C70 0.4-50-0.1 MG TABS One tab daily 30 tablet 2    glucose blood VI test strips (TRUETRACK TEST) strip CHECK GLUCOSE 4 TIMES A  each 5    Blood Glucose Monitoring Suppl (TRUETRACK BLOOD GLUCOSE) W/DEVICE KIT As needed 1 kit 0    B-D ULTRAFINE III SHORT PEN 31G X 8 MM MISC USES UP TO 4 A  each 3    carvedilol (COREG) 3.125 MG tablet Take 3.125 mg by mouth 2 times daily (with meals).  HYDROcodone-acetaminophen (NORCO) 5-325 MG per tablet Take 1 tablet by mouth every 8 hours as needed for Pain for up to 28 days.  84 tablet 0    amitriptyline (ELAVIL) 25 MG tablet Take 1 tablet by mouth nightly 28 tablet 0    DULoxetine (CYMBALTA) 30 MG extended release capsule Take 1 capsule by mouth 2 times daily 60 capsule 0    Lidocaine 1.8 % PTCH Apply 1 patch topically daily 30 patch 0     No facility-administered medications prior to visit. SOCIAL/FAMILY/PAST MEDICAL HISTORY: Ms. Diana Mancera, family and past medical history was reviewed. REVIEW OF SYSTEMS:    Respiratory: Negative for apnea, chest tightness and shortness of breath or change in baseline breathing. Gastrointestinal: Negative for nausea, vomiting, abdominal pain, diarrhea, constipation, blood in stool and abdominal distention. PHYSICAL EXAM:   Nursing note and vitals reviewed. BP (!) 142/84   Pulse 62   LMP 12/08/2015   SpO2 100%   Constitutional: She appears well-developed and well-nourished. No acute distress. Skin: Skin is warm and dry, good turgor. No rash noted. She is not diaphoretic. Cardiovascular: Normal rate, regular rhythm, normal heart sounds, and does not have murmur. Pulmonary/Chest: Effort normal. No respiratory distress. She does not have wheezes in the lung fields. She has no rales. Neurological/Psychiatric:She is alert and oriented to person, place, and time. Coordination is  normal.  Her mood isAppropriate and affect is Flat/blunted . Xray of the Left Hip 5/1/19:  No acute bony pathology.       Minimal degenerative changes     IMPRESSION:   1. Chronic pain syndrome    2. Status post replacement of right shoulder joint, with Dr. Joseph Guzman 2/18/19    3. H/O shoulder surgery, 2013/2018    4. DDD (degenerative disc disease), lumbosacral    5. Chronic bilateral low back pain with bilateral sciatica    6. Failed back syndrome, lumbosacral    7. Primary osteoarthritis of left hip, mild    8. Cervical disc disease    9. Bulging of cervical intervertebral disc    10. Cervical spinal stenosis    11.  Fatigue, unspecified type  metFORMIN (GLUCOPHAGE) 500 MG tablet Take 500 mg by mouth      diclofenac (VOLTAREN) 75 MG EC tablet Take 75 mg by mouth 2 times daily      fenofibrate (TRICOR) 48 MG tablet TAKE 1 TABLET BY MOUTH DAILY. 90 tablet 2    vitamin D (ERGOCALCIFEROL) 64485 units CAPS capsule TAKE 1 CAPSULE  WEEKLY 13 capsule 2    lisinopril (PRINIVIL;ZESTRIL) 20 MG tablet Take 1 tablet by mouth daily 90 tablet 3    Folic Acid-Vit E0-YKH B35 0.4-50-0.1 MG TABS One tab daily 30 tablet 2    glucose blood VI test strips (TRUETRACK TEST) strip CHECK GLUCOSE 4 TIMES A  each 5    Blood Glucose Monitoring Suppl (TRUETRACK BLOOD GLUCOSE) W/DEVICE KIT As needed 1 kit 0    B-D ULTRAFINE III SHORT PEN 31G X 8 MM MISC USES UP TO 4 A  each 3    carvedilol (COREG) 3.125 MG tablet Take 3.125 mg by mouth 2 times daily (with meals). No current facility-administered medications for this visit. I will continue her current medication regimen  which is part of the above treatment schedule. It has been helping with Ms. Zak Hunt's chronic  medical problems which for this visit include: The primary encounter diagnosis was Chronic pain syndrome. Diagnoses of Status post replacement of right shoulder joint, with Dr. Davis Going 2/18/19, H/O shoulder surgery, 2013/2018, DDD (degenerative disc disease), lumbosacral, Chronic bilateral low back pain with bilateral sciatica, Failed back syndrome, lumbosacral, Primary osteoarthritis of left hip, mild, Cervical disc disease, Bulging of cervical intervertebral disc, Cervical spinal stenosis, Fatigue, unspecified type, Primary insomnia, Depression, unspecified depression type, and Anxiety were also pertinent to this visit. Risks and benefits of the medications and other alternative treatments  including no treatment were discussed with the patient. The common side effects of these medications were also explained to the patient. Informed verbal consent was obtained.

## 2019-05-03 NOTE — PATIENT INSTRUCTIONS
Patient Education        Back Stretches: Exercises  Your Care Instructions  Here are some examples of exercises for stretching your back. Start each exercise slowly. Ease off the exercise if you start to have pain. Your doctor or physical therapist will tell you when you can start these exercises and which ones will work best for you. How to do the exercises  Overhead stretch    1. Stand comfortably with your feet shoulder-width apart. 2. Looking straight ahead, raise both arms over your head and reach toward the ceiling. Do not allow your head to tilt back. 3. Hold for 15 to 30 seconds, then lower your arms to your sides. 4. Repeat 2 to 4 times. Side stretch    1. Stand comfortably with your feet shoulder-width apart. 2. Raise one arm over your head, and then lean to the other side. 3. Slide your hand down your leg as you let the weight of your arm gently stretch your side muscles. Hold for 15 to 30 seconds. 4. Repeat 2 to 4 times on each side. Press-up    1. Lie on your stomach, supporting your body with your forearms. 2. Press your elbows down into the floor to raise your upper back. As you do this, relax your stomach muscles and allow your back to arch without using your back muscles. As your press up, do not let your hips or pelvis come off the floor. 3. Hold for 15 to 30 seconds, then relax. 4. Repeat 2 to 4 times. Relax and rest    1. Lie on your back with a rolled towel under your neck and a pillow under your knees. Extend your arms comfortably to your sides. 2. Relax and breathe normally. 3. Remain in this position for about 10 minutes. 4. If you can, do this 2 or 3 times each day. Follow-up care is a key part of your treatment and safety. Be sure to make and go to all appointments, and call your doctor if you are having problems. It's also a good idea to know your test results and keep a list of the medicines you take. Where can you learn more?   Go to shoulder. 4. Hold 15 to 30 seconds. 5. Repeat 2 to 4 times. Shoulder blade squeeze    1. Stand with your arms at your sides, and squeeze your shoulder blades together. Do not raise your shoulders up as you squeeze. 2. Hold 6 seconds. 3. Repeat 8 to 12 times. Resisted rows    1. Put the band around a solid object at about waist level. (A bedpost will work well.) Each hand should hold an end of the band. 2. With your elbows at your sides and bent to 90 degrees, pull the band back. Your shoulder blades should move toward each other. Return to the starting position. 3. Repeat 8 to 12 times. External rotator strengthening exercise    1. Start by tying a piece of elastic exercise material to a doorknob. You can use surgical tubing or Thera-Band. (You may also hold one end of the band in each hand.)  2. Stand or sit with your shoulder relaxed and your elbow bent 90 degrees. Your upper arm should rest comfortably against your side. Squeeze a rolled towel between your elbow and your body for comfort. This will help keep your arm at your side. 3. Hold one end of the elastic band with the hand of the painful arm. 4. Start with your forearm across your belly. Slowly rotate the forearm out away from your body. Keep your elbow and upper arm tucked against the towel roll or the side of your body until you begin to feel tightness in your shoulder. Slowly move your arm back to where you started. 5. Repeat 8 to 12 times. Internal rotator strengthening exercise    1. Start by tying a piece of elastic exercise material to a doorknob. You can use surgical tubing or Thera-Band. 2. Stand or sit with your shoulder relaxed and your elbow bent 90 degrees. Your upper arm should rest comfortably against your side. Squeeze a rolled towel between your elbow and your body for comfort. This will help keep your arm at your side. 3. Hold one end of the elastic band in the hand of the painful arm.   4. Slowly rotate your forearm toward your body until it touches your belly. Slowly move it back to where you started. 5. Keep your elbow and upper arm firmly tucked against the towel roll or at your side. 6. Repeat 8 to 12 times. Pendulum swing    1. Hold on to a table or the back of a chair with your good arm. Then bend forward a little and let your sore arm hang straight down. This exercise does not use the arm muscles. Rather, use your legs and your hips to create movement that makes your arm swing freely. 2. Use the movement from your hips and legs to guide the slightly swinging arm back and forth like a pendulum (or elephant trunk). Then guide it in circles that start small (about the size of a dinner plate). Make the circles a bit larger each day, as your pain allows. 3. Do this exercise for 5 minutes, 5 to 7 times each day. 4. As you have less pain, try bending over a little farther to do this exercise. This will increase the amount of movement at your shoulder. Follow-up care is a key part of your treatment and safety. Be sure to make and go to all appointments, and call your doctor if you are having problems. It's also a good idea to know your test results and keep a list of the medicines you take. Where can you learn more? Go to https://[employer] org and sign in to your [employer] account. Enter H562 in the [facility] box to learn more about \"Shoulder Arthritis: Exercises. \"     If you do not have an account, please click on the \"Sign Up Now\" link. Current as of: September 20, 2018  Content Version: 11.9  © 1750-9093 [employer]. Care instructions adapted under license by [facility] [place] ([facility]). If you have questions about a medical condition or this instruction, always ask your healthcare professional. [address] any warranty or liability for your use of this information.

## 2019-05-17 ENCOUNTER — TELEPHONE (OUTPATIENT)
Dept: PAIN MANAGEMENT | Age: 49
End: 2019-05-17

## 2019-05-21 RX ORDER — ESCITALOPRAM OXALATE 10 MG/1
10 TABLET ORAL DAILY
Qty: 90 TABLET | Refills: 3 | Status: SHIPPED | OUTPATIENT
Start: 2019-05-21

## 2019-05-21 RX ORDER — AMITRIPTYLINE HYDROCHLORIDE 25 MG/1
25 TABLET, FILM COATED ORAL NIGHTLY
Qty: 90 TABLET | Refills: 0 | Status: SHIPPED | OUTPATIENT
Start: 2019-05-21

## 2024-09-25 LAB
C-REACTIVE PROTEIN WIDE RANGE: 1.3 MG/L (ref 0–5)
HCT VFR BLD CALC: 42.1 % (ref 35–46)
HEMOGLOBIN: 13.4 G/DL (ref 11.7–15.5)
LEUKOCYTES, BLD: 6.64 10*3/UL (ref 3.8–10.8)
MCH RBC QN AUTO: 31 PG (ref 27–33)
MCHC RBC AUTO-ENTMCNC: 31.8 G/DL (ref 30–36)
MCV RBC AUTO: 97.5 FL (ref 80–100)
PDW BLD-RTO: 12.9 % (ref 11–15)
PLATELET # BLD: 211 10*3/UL (ref 140–400)
PMV BLD AUTO: 11.4 FL (ref 9–13)
RBC # BLD: 4.32 10*6/UL (ref 3.8–5.1)
SED RATE, AUTOMATED: 8 MM/HR (ref 0–30)

## 2024-09-26 LAB — ANTI-NUCLEAR ANTIBODY (ANA): NEGATIVE
